# Patient Record
(demographics unavailable — no encounter records)

---

## 2024-10-09 NOTE — ASSESSMENT
[FreeTextEntry1] : Lvef 55% no swma 1+ MR  Lifestyle changes for control of BP reviewed ie wgt reduction, salt restriction, Etoh avoidance, exercise 30 min aerobic activity per day, avoid NSAID use self monitor BP TIW Lifestyle advice for LDL reduction including reduction of red meat consumption concentrating on a plant based diet. 30 min aerobic exercise per day. Calorie reduction to target BMI<25

## 2024-10-09 NOTE — REVIEW OF SYSTEMS
[Palpitations] : palpitations [Negative] : Heme/Lymph [SOB] : no shortness of breath [Dyspnea on exertion] : not dyspnea during exertion

## 2024-10-09 NOTE — DISCUSSION/SUMMARY
[Hypertension] : hypertension [Exercise Regimen] : an exercise regimen [Home Monitor] : a home monitor [Weight Loss] : weight loss [ETOH Moderation] : alcohol moderation [Low Sodium Diet] : low sodium diet [NSAIDs Avoidance] : non-steroidal anti-inflammatory drugs avoidance [Patient] : the patient [FreeTextEntry1] : Palpitations:  The impression is paroxysmal atrial tachycardia. The diagnostic plan includes an echocardiogram. the evalution will be done as an outpatient. Medication management includes continuing beta blockers. Medication Changes: Additional Comments:. short lived arrhythmias   can take extra BB on prn basis  [EKG obtained to assist in diagnosis and management of assessed problem(s)] : EKG obtained to assist in diagnosis and management of assessed problem(s)

## 2024-10-09 NOTE — HISTORY OF PRESENT ILLNESS
[FreeTextEntry1] : 07/13/22 Patient is a 75-year-old black female with hypertension with periodic palpitations for which she takes metoprolol 25mg ER. Patient recently underwent an ENT procedure and had some topical anesthesia which apparently triggered some palpitations which lasted number of minutes so she did not pursue evaluation in the emergency room as palpitations of self resolved. She has had no recurrence of those symptoms with no symptoms of chest pain shortness of breath syncope near syncope orthopnea PND.   12/20/22 Denies Chest Pain, SOB, Dizziness, Leg edema, Orthopnea, PND, , Syncope, SAGE, Diaphoresis. noctural rapid HB not clear pace of beat causes her to get up from bed  no daytime arrhythmias   05/04/23 11/16/23 had spike in BP during bowel prep for CF  was in ER at [facility] w/u was given PPI which caused spike in BP  has ongoing palpitation once week lasting 1-2 hrs  was told she has ulcer on egd  4/4/24  recent MRI for HA microvasc disease no pathology  has radiculopathy in arms spinal disease   10/09/24 mild dizziness  no angina or sage

## 2024-10-09 NOTE — ADDENDUM
[FreeTextEntry1] : Foster Hudson assisted in documentation on 10/09/24 acting as a scribe for Dr. Gregory Blanca.